# Patient Record
Sex: FEMALE
[De-identification: names, ages, dates, MRNs, and addresses within clinical notes are randomized per-mention and may not be internally consistent; named-entity substitution may affect disease eponyms.]

---

## 2020-12-18 ENCOUNTER — TRANSCRIPTION ENCOUNTER (OUTPATIENT)
Age: 80
End: 2020-12-18

## 2024-04-30 PROBLEM — Z00.00 ENCOUNTER FOR PREVENTIVE HEALTH EXAMINATION: Status: ACTIVE | Noted: 2024-04-30

## 2024-08-05 ENCOUNTER — NON-APPOINTMENT (OUTPATIENT)
Age: 84
End: 2024-08-05

## 2024-08-06 ENCOUNTER — APPOINTMENT (OUTPATIENT)
Dept: OTOLARYNGOLOGY | Facility: CLINIC | Age: 84
End: 2024-08-06

## 2024-08-06 PROBLEM — Z87.891 FORMER SMOKER: Status: ACTIVE | Noted: 2024-08-06

## 2024-08-06 PROBLEM — Z87.19 HISTORY OF GASTROESOPHAGEAL REFLUX (GERD): Status: RESOLVED | Noted: 2024-08-06 | Resolved: 2024-08-06

## 2024-08-06 PROBLEM — R26.89 IMBALANCE: Status: ACTIVE | Noted: 2024-08-06

## 2024-08-06 PROBLEM — H61.21 IMPACTED CERUMEN OF RIGHT EAR: Status: ACTIVE | Noted: 2024-08-06

## 2024-08-06 PROBLEM — G62.9 PERIPHERAL NEUROPATHY: Status: ACTIVE | Noted: 2024-08-06

## 2024-08-06 PROBLEM — H93.293 ABNORMAL AUDITORY PERCEPTION OF BOTH EARS: Status: ACTIVE | Noted: 2024-08-06

## 2024-08-06 PROBLEM — G62.89 NEUROPATHY, PERIPHERAL AXONAL: Status: ACTIVE | Noted: 2024-08-06

## 2024-08-06 PROBLEM — Z86.79 HISTORY OF ATRIAL FIBRILLATION: Status: RESOLVED | Noted: 2024-08-06 | Resolved: 2024-08-06

## 2024-08-06 PROBLEM — H90.5 ACQUIRED SENSORINEURAL HEARING LOSS: Status: ACTIVE | Noted: 2024-08-06

## 2024-08-06 PROBLEM — Z87.898 FORMER CONSUMPTION OF ALCOHOL: Status: ACTIVE | Noted: 2024-08-06

## 2024-08-06 PROCEDURE — 69210 REMOVE IMPACTED EAR WAX UNI: CPT

## 2024-08-06 PROCEDURE — 99204 OFFICE O/P NEW MOD 45 MIN: CPT | Mod: 25

## 2024-08-06 PROCEDURE — 92567 TYMPANOMETRY: CPT

## 2024-08-06 PROCEDURE — 92557 COMPREHENSIVE HEARING TEST: CPT

## 2024-08-06 NOTE — HISTORY OF PRESENT ILLNESS
[de-identified] : 83 year old female presents with disequilibrium.   Referred by Neuro Dr. Juana Lambert  History of proprioception disorder Completed EMG 01/23/20 Has intermittent episodes of imbalance and unsteadiness  She states she does NOT have vertigo- does not take meclazine.  States she may have gradual hearing but not bothersome. Does not wear hearing aids  Patient denies otalgia, otorrhea, clogged ears, ear bleeding, ear infections,  tinnitus, headaches related to hearing.  CT Head 02/11/22  ([facility]) MRI Head 01/21/21 (scanned in chart)

## 2024-08-06 NOTE — PROCEDURE
[FreeTextEntry3] : Procedure note:  Right cerumenectomy  Aug 06, 2024   Description of Procedure:  Cerumen was noted requiring debridement under the operating microscope using otologic instrumentation.  The patient tolerated the procedure without complications.

## 2024-08-06 NOTE — ASSESSMENT
[FreeTextEntry1] : Exam today shows intact tympanic membrane without effusion or retraction, vital facial function is normal.  Head impulse testing is negative.  She has a slow gait but some unsteadiness but does not veer in either direction.  Reviewed audiogram which shows a bilateral downsloping high-frequency sensorineural hearing loss.  I reviewed other outside records including outside ENT office notes recommending vestibular therapy.I reviewed other outside records including EMG nerve conduction study results which show reduced amplitudes.  Suspect peripheral neuropathy as a primary etiology for symptoms.  Patient has never complained of nerve pain, discussed possibility of gabapentin exacerbating reduce progress after response.  Provided new referral to vestibular therapy for falls risk assessment and substitution strategies.  Consider reducing or discontinuing gabapentin.  Also provided referral to neurologist here at [facility].

## 2024-12-03 ENCOUNTER — APPOINTMENT (OUTPATIENT)
Dept: NEUROLOGY | Facility: CLINIC | Age: 84
End: 2024-12-03